# Patient Record
Sex: FEMALE | Race: WHITE | NOT HISPANIC OR LATINO | Employment: FULL TIME | RURAL
[De-identification: names, ages, dates, MRNs, and addresses within clinical notes are randomized per-mention and may not be internally consistent; named-entity substitution may affect disease eponyms.]

---

## 2019-01-14 ENCOUNTER — OFFICE VISIT (OUTPATIENT)
Dept: RETAIL CLINIC | Facility: CLINIC | Age: 49
End: 2019-01-14

## 2019-01-14 VITALS
SYSTOLIC BLOOD PRESSURE: 122 MMHG | BODY MASS INDEX: 43.24 KG/M2 | HEIGHT: 62 IN | DIASTOLIC BLOOD PRESSURE: 74 MMHG | HEART RATE: 99 BPM | RESPIRATION RATE: 15 BRPM | OXYGEN SATURATION: 98 % | TEMPERATURE: 98.2 F | WEIGHT: 235 LBS

## 2019-01-14 DIAGNOSIS — J01.00 ACUTE MAXILLARY SINUSITIS, RECURRENCE NOT SPECIFIED: Primary | ICD-10-CM

## 2019-01-14 LAB
EXPIRATION DATE: NORMAL
INTERNAL CONTROL: NORMAL
Lab: NORMAL
S PYO AG THROAT QL: NEGATIVE

## 2019-01-14 PROCEDURE — 87880 STREP A ASSAY W/OPTIC: CPT | Performed by: NURSE PRACTITIONER

## 2019-01-14 PROCEDURE — 99213 OFFICE O/P EST LOW 20 MIN: CPT | Performed by: NURSE PRACTITIONER

## 2019-01-14 RX ORDER — AMOXICILLIN AND CLAVULANATE POTASSIUM 875; 125 MG/1; MG/1
1 TABLET, FILM COATED ORAL 2 TIMES DAILY
Qty: 14 TABLET | Refills: 0 | Status: SHIPPED | OUTPATIENT
Start: 2019-01-14 | End: 2019-01-21

## 2019-01-14 RX ORDER — PSEUDOEPHEDRINE HCL 120 MG/1
120 TABLET, FILM COATED, EXTENDED RELEASE ORAL EVERY 12 HOURS
Qty: 10 TABLET | Refills: 0 | Status: SHIPPED | OUTPATIENT
Start: 2019-01-14 | End: 2019-01-19

## 2019-01-14 NOTE — PATIENT INSTRUCTIONS

## 2019-01-14 NOTE — PROGRESS NOTES
"Pan Hunter is a 48 y.o. female.   /74   Pulse 99   Temp 98.2 °F (36.8 °C)   Resp 15   Ht 157.5 cm (62\")   Wt 107 kg (235 lb)   LMP  (LMP Unknown)   SpO2 98%   BMI 42.98 kg/m²   Past Medical History:   Diagnosis Date   • Hypertension    • Wears glasses      No Known Allergies      Sore Throat    This is a new problem. The current episode started 1 to 4 weeks ago. The problem has been gradually worsening. There has been no fever. The pain is moderate. Associated symptoms include congestion, coughing, ear pain (worse on right side, moderate), headaches (sinus) and a plugged ear sensation. Pertinent negatives include no abdominal pain, diarrhea, drooling, ear discharge, hoarse voice, neck pain, shortness of breath, stridor, swollen glands, trouble swallowing or vomiting. Treatments tried: musinex.   Cough   Associated symptoms include ear pain (worse on right side, moderate), headaches (sinus) and a sore throat. Pertinent negatives include no shortness of breath.   Earache    Associated symptoms include coughing, headaches (sinus) and a sore throat. Pertinent negatives include no abdominal pain, diarrhea, ear discharge, neck pain or vomiting.        The following portions of the patient's history were reviewed and updated as appropriate: allergies, current medications, past family history, past medical history, past social history, past surgical history and problem list.    Review of Systems   Constitutional: Positive for activity change, appetite change and fatigue.   HENT: Positive for congestion, ear pain (worse on right side, moderate), sinus pressure, sinus pain (worse on right side) and sore throat. Negative for drooling, ear discharge, hoarse voice and trouble swallowing.    Eyes: Negative.    Respiratory: Positive for cough. Negative for shortness of breath and stridor.    Cardiovascular: Negative.    Gastrointestinal: Negative.  Negative for abdominal pain, diarrhea and " vomiting.   Musculoskeletal: Negative for neck pain.   Neurological: Positive for headaches (sinus).       Objective   Physical Exam   Constitutional: She appears well-developed and well-nourished.  Non-toxic appearance.   HENT:   Head: Normocephalic and atraumatic.   Right Ear: Ear canal normal. Tympanic membrane is bulging. Tympanic membrane is not perforated and not erythematous.   Left Ear: Ear canal normal. Tympanic membrane is bulging. Tympanic membrane is not perforated and not erythematous.   Nose: Mucosal edema and rhinorrhea present. Right sinus exhibits maxillary sinus tenderness. Right sinus exhibits no frontal sinus tenderness. Left sinus exhibits maxillary sinus tenderness. Left sinus exhibits no frontal sinus tenderness.   Mouth/Throat: Uvula is midline. Posterior oropharyngeal erythema (very mild) present.   Cardiovascular: Regular rhythm and normal heart sounds.   Pulmonary/Chest: Effort normal. She has no wheezes. She has no rhonchi. She has no rales.   Lymphadenopathy:     She has no cervical adenopathy.   Skin: Skin is warm and dry.       Assessment/Plan   Rozina was seen today for sore throat, cough and earache.    Diagnoses and all orders for this visit:    Acute maxillary sinusitis, recurrence not specified  -     POC Rapid Strep A    Other orders  -     amoxicillin-clavulanate (AUGMENTIN) 875-125 MG per tablet; Take 1 tablet by mouth 2 (Two) Times a Day for 7 days.  -     pseudoephedrine (SUDAFED 12 HOUR) 120 MG 12 hr tablet; Take 1 tablet by mouth Every 12 (Twelve) Hours for 5 days.        Results for orders placed or performed in visit on 01/14/19   POC Rapid Strep A   Result Value Ref Range    Rapid Strep A Screen Negative Negative, VALID, INVALID, Not Performed    Internal Control Passed Passed    Lot Number [id number]     Expiration Date 8,784,448

## 2024-05-29 ENCOUNTER — TRANSCRIBE ORDERS (OUTPATIENT)
Dept: NUTRITION | Facility: HOSPITAL | Age: 54
End: 2024-05-29
Payer: COMMERCIAL

## 2024-05-29 DIAGNOSIS — K76.0 FATTY LIVER: Primary | ICD-10-CM

## 2024-06-20 ENCOUNTER — HOSPITAL ENCOUNTER (OUTPATIENT)
Dept: NUTRITION | Facility: HOSPITAL | Age: 54
Setting detail: RECURRING SERIES
Discharge: HOME | End: 2024-06-20
Payer: COMMERCIAL

## 2024-06-20 PROCEDURE — 97802 MEDICAL NUTRITION INDIV IN: CPT

## 2024-06-20 NOTE — CONSULTS
[facility]          Initial 60 Minute Nutrition Visit    Date: 2024   Patient Name: Rozina Hunter  : 1970   MRN: [id number]   Referring Provider: Can Hernandez APRN    Reason for Visit: Fatty liver  Visit Format: IP    Nutrition Assessment       Social History:   Social History     Socioeconomic History    Marital status:    Tobacco Use    Smoking status: Never   Substance and Sexual Activity    Alcohol use: No    Drug use: No     Active Problem List:   Patient Active Problem List    Diagnosis     Breast hypertrophy in female [N62]       Current Medications:   Current Outpatient Medications:     estradiol (MINIVELLE, VIVELLE-DOT) 0.1 MG/24HR patch, Place 1 patch on the skin 2 (two) times a week., Disp: , Rfl:     lisinopril-hydrochlorothiazide (PRINZIDE,ZESTORETIC) 20-12.5 MG per tablet, Take 1 tablet by mouth daily., Disp: , Rfl:     tolterodine LA (DETROL LA) 4 MG 24 hr capsule, Take 4 mg by mouth daily., Disp: , Rfl:     Labs: n/a    Hunger Vital Sign Food Insecurity Assessment:  Within the past 12 months I/we worried whether our food would run out before I/we got money to buy more: no   Within the past 12 months the food I/we bought just didn't last and I/we didn't have money to get more: no   Use of food assistance programs (WIC, food stamps, food isaac) no       Food & Nutrition Related History       Food Allergies: none  Food Intolerances: none  Food Behavior: stress eating  Nutrition Impact Symptoms:  none  Gastrointestinal conditions that impact intake or food choices: n/a  Details at home: n/a  Who prepares most meals: patient   Who does grocery shopping: patient   How many meals are purchased from fast food/sit down restaurants per week: 1  Difficulty chewin - Normal  Difficulty swallowin - Normal  Diet requirement related to personal preference or cultural belief: Mediterranean or DASH diet  History of eating disorder/disordered  "eating habits: None  Language/communication details: English  Barriers to learning: No barriers identified at this time    24 Hour Recall: See Assessment       Anthropometrics      Height:   Ht Readings from Last 1 Encounters:   01/14/19 157.5 cm (62\")     Weight:   Wt Readings from Last 3 Encounters:   01/14/19 107 kg (235 lb)   07/19/16 61.8 kg (136 lb 4 oz)   07/11/16 106 kg (234 lb 2.1 oz)     BMI: There is no height or weight on file to calculate BMI.   Weight Change: States she has lost 19 pounds since Middle of May      Physical Activity         Physical activity comments: Tries to walk 4-7 days of the week      Estimated Needs     Estimated Energy Needs: n/a    Estimated Protein Needs: n/a     Estimated Fluid Needs: n/a     Discussion / Education      Patient is a 53 year old female referred for fatty liver. Patient states she was diagnosed with fatty liver a few months ago. Since then she has been trying to improve her diet and lifestyle. She states that she has lost 19 pounds since the middle of May. She states that she has been trying to follow a keto diet. She states that she will walk multiple times a day for 4-7 days of the week. She states that she can be a stress eater. She states that she does not eat out very often, 1 time per week. She states that she has stopped drinking soda and has switched to sparkling water and is using crystal light packets.     Education provided today primarily focused on weight management. Discussed what fatty liver is and the risk factors for developing fatty liver. Discussed that fatty liver is mainly treated by weight loss, improving diet, and exercising more regularly. Discussed how a 5-10% weight loss has been shown to reverse thew effects of fatty liver disease. Discussed how vitamin E consumption can be beneficial for fatty liver. Discussed how The American Association for the Study of Liver Disease NAFLD guidance recommends >3 cups of coffee (caffeinated or " decaffeinated) daily because of its association with less advanced liver disease.     Educated patient on the three macronutrients and what each do for our body. Discussed the difference between saturated and unsaturated fat. Discussed how to build a healthy plate by using the plate method. Explained the importance of portion control and balanced meals. Discussed the importance of fiber for GI health, satiety, and lowering cholesterol levels. Discussed healthy snack options and quick meal ideas. Discussed the Mediterranean diet and recommended lowering sodium, saturated fat, and added sugar consumption. Provided patient with tips and strategies to promote weight loss according to AND. Discussed different mindful eating strategies to help make more conscious decisions during meal and snack times. Encouraged adding physical activity to daily routine and gave examples of how to incorporate that into routine. Encouraged patient to reach out with any additional questions or concerns.     Assessment of patient engagement: Engaged    Measurement of understanding: Patient verbalized understanding    Resources Provided: 3 Macronutrients, weight management packet, Mediterranean diet, high fiber snacks, Fatty liver handout, Tips to support weight loss (AND)    Goal (s)      Goal 1: Choose better breakfast options     Goal 2: include more vegetables throughout the day          Plan of Care     PES Statement:   Overweight / Obesity related to diet and lifestye as evidenced by BMI.     Follow Up Visit      Follow Up:   July 23 @ 10:30    Total of 60 minutes spent with patient on nutrition counseling. Education based on Academy of Nutrition and Dietetics guidelines. Patient was provided with RD's contact information. Thank you for this referral.

## 2025-05-01 ENCOUNTER — TRANSCRIBE ORDERS (OUTPATIENT)
Dept: GENERAL RADIOLOGY | Facility: HOSPITAL | Age: 55
End: 2025-05-01
Payer: COMMERCIAL

## 2025-05-01 ENCOUNTER — HOSPITAL ENCOUNTER (OUTPATIENT)
Dept: GENERAL RADIOLOGY | Facility: HOSPITAL | Age: 55
Discharge: HOME | End: 2025-05-01
Admitting: NURSE PRACTITIONER
Payer: COMMERCIAL

## 2025-05-01 DIAGNOSIS — Z01.818 PRE-OP EXAMINATION: Primary | ICD-10-CM

## 2025-05-01 DIAGNOSIS — Z01.818 PRE-OP EXAMINATION: ICD-10-CM

## 2025-05-01 PROCEDURE — 71046 X-RAY EXAM CHEST 2 VIEWS: CPT
